# Patient Record
Sex: MALE | Race: WHITE | Employment: OTHER | URBAN - METROPOLITAN AREA
[De-identification: names, ages, dates, MRNs, and addresses within clinical notes are randomized per-mention and may not be internally consistent; named-entity substitution may affect disease eponyms.]

---

## 2019-10-04 ENCOUNTER — LAB ENCOUNTER (OUTPATIENT)
Dept: LAB | Age: 49
End: 2019-10-04
Attending: INTERNAL MEDICINE
Payer: COMMERCIAL

## 2019-10-04 DIAGNOSIS — Z00.01 ENCOUNTER FOR GENERAL ADULT MEDICAL EXAMINATION WITH ABNORMAL FINDINGS: Primary | ICD-10-CM

## 2019-10-04 PROCEDURE — 85025 COMPLETE CBC W/AUTO DIFF WBC: CPT

## 2019-10-04 PROCEDURE — 80061 LIPID PANEL: CPT

## 2019-10-04 PROCEDURE — 36415 COLL VENOUS BLD VENIPUNCTURE: CPT

## 2019-10-04 PROCEDURE — 84443 ASSAY THYROID STIM HORMONE: CPT

## 2019-10-04 PROCEDURE — 80053 COMPREHEN METABOLIC PANEL: CPT

## 2020-04-21 ENCOUNTER — HOSPITAL ENCOUNTER (EMERGENCY)
Facility: HOSPITAL | Age: 50
Discharge: HOME | End: 2020-04-21
Attending: EMERGENCY MEDICINE
Payer: COMMERCIAL

## 2020-04-21 VITALS
HEIGHT: 74 IN | SYSTOLIC BLOOD PRESSURE: 130 MMHG | DIASTOLIC BLOOD PRESSURE: 88 MMHG | RESPIRATION RATE: 18 BRPM | TEMPERATURE: 99 F | HEART RATE: 87 BPM | OXYGEN SATURATION: 99 % | WEIGHT: 195 LBS | BODY MASS INDEX: 25.03 KG/M2

## 2020-04-21 DIAGNOSIS — S81.812A LEG LACERATION, LEFT, INITIAL ENCOUNTER: Primary | ICD-10-CM

## 2020-04-21 PROCEDURE — 90471 IMMUNIZATION ADMIN: CPT

## 2020-04-21 PROCEDURE — 12034 INTMD RPR S/TR/EXT 7.6-12.5: CPT

## 2020-04-21 PROCEDURE — 99283 EMERGENCY DEPT VISIT LOW MDM: CPT

## 2020-04-21 RX ORDER — METHYLPHENIDATE HYDROCHLORIDE 10 MG/1
10 TABLET ORAL 2 TIMES DAILY
COMMUNITY
End: 2021-06-20 | Stop reason: ALTCHOICE

## 2020-04-21 NOTE — ED PROVIDER NOTES
Procedure Name: Laceration Repair  Location: Left proximal knee     PROCEDURE:  Laceration measures 8 cm, 1 cm gaping at its widest point, 0.5 cm depth  The appropriate timeout was taken. The area was prepped and draped in the usual sterile fashion.  Local

## 2020-04-21 NOTE — ED PROVIDER NOTES
Patient Seen in: [facility] Emergency Department      History   Patient presents with:  Laceration Abrasion    Stated Complaint: leg laceration    HPI    40-year-old male presents with a laceration to the left lower extremity.   He reports that about Neuro: Alert oriented and nonfocal   Skin: no rashes or nodules    ED Course   Labs Reviewed - No data to display               MDM     27-year-old male presents with laceration of the left leg from chainsaw. Please see above PA note for repair.   Neurov

## 2020-04-21 NOTE — ED INITIAL ASSESSMENT (HPI)
Pt reports left thigh/knee laceration from a chain saw. Range of motion still intact, pressure gauze on leg, bleeding controlled, breakthrough bleeding noted.

## 2021-06-20 ENCOUNTER — APPOINTMENT (OUTPATIENT)
Dept: GENERAL RADIOLOGY | Age: 51
End: 2021-06-20
Attending: PHYSICIAN ASSISTANT
Payer: COMMERCIAL

## 2021-06-20 ENCOUNTER — HOSPITAL ENCOUNTER (OUTPATIENT)
Age: 51
Discharge: HOME | End: 2021-06-20
Payer: COMMERCIAL

## 2021-06-20 VITALS
HEIGHT: 74 IN | TEMPERATURE: 98 F | DIASTOLIC BLOOD PRESSURE: 85 MMHG | SYSTOLIC BLOOD PRESSURE: 115 MMHG | RESPIRATION RATE: 12 BRPM | WEIGHT: 180 LBS | BODY MASS INDEX: 23.1 KG/M2 | HEART RATE: 65 BPM

## 2021-06-20 DIAGNOSIS — L08.9 SKIN INFECTION: ICD-10-CM

## 2021-06-20 DIAGNOSIS — S89.91XA INJURY OF RIGHT LOWER EXTREMITY, INITIAL ENCOUNTER: Primary | ICD-10-CM

## 2021-06-20 PROCEDURE — 99203 OFFICE O/P NEW LOW 30 MIN: CPT | Performed by: PHYSICIAN ASSISTANT

## 2021-06-20 PROCEDURE — 73590 X-RAY EXAM OF LOWER LEG: CPT | Performed by: PHYSICIAN ASSISTANT

## 2021-06-20 RX ORDER — CEPHALEXIN 500 MG/1
500 TABLET ORAL 4 TIMES DAILY
Qty: 40 TABLET | Refills: 0 | Status: SHIPPED | OUTPATIENT
Start: 2021-06-20 | End: 2021-06-30

## 2021-06-20 NOTE — ED INITIAL ASSESSMENT (HPI)
Pt c/o x 2 weeks ago was hit in right lower leg with a ground ball while playing softball and on 1st base. Pt noticed redness and warmth today and increased pain. Pt states very painful to bear weight.

## 2021-06-20 NOTE — ED PROVIDER NOTES
Patient Seen in: [facility] [address]      History   Patient presents with:  Leg or Foot Injury    Stated Complaint: Leg Injury    HPI/Subjective:   HPI    45 yo male with complaint of pain and swelling to his right lower extremity.   Patient s Resp 16   Ht 188 cm (6' 2\")   Wt 81.6 kg   BMI 23.11 kg/m²         Physical Exam  Vitals and nursing note reviewed. Constitutional:       Appearance: He is well-developed. HENT:      Head: Normocephalic.       Right Ear: External ear normal.      Left ago. Redness, swelling and pain in the area. FINDINGS:  BONES:  Normal.  No significant arthropathy or acute abnormality. SOFT TISSUES:  Negative. No visible soft tissue swelling. EFFUSION:  None visible. OTHER:  Negative.             CONCLUSION:  Negat Simran Figueroa MD  [facility] [phone]                Medications Prescribed:  Current Discharge Medication List    START taking these medications    Cephalexin 500 MG Oral Tab  Take 500 mg by mouth 4 (four)

## 2021-09-17 ENCOUNTER — APPOINTMENT (OUTPATIENT)
Dept: GENERAL RADIOLOGY | Age: 51
End: 2021-09-17
Attending: PHYSICIAN ASSISTANT
Payer: COMMERCIAL

## 2021-09-17 ENCOUNTER — HOSPITAL ENCOUNTER (OUTPATIENT)
Age: 51
Discharge: HOME | End: 2021-09-17
Payer: COMMERCIAL

## 2021-09-17 VITALS
RESPIRATION RATE: 18 BRPM | OXYGEN SATURATION: 99 % | TEMPERATURE: 98 F | HEIGHT: 74 IN | SYSTOLIC BLOOD PRESSURE: 110 MMHG | WEIGHT: 180 LBS | BODY MASS INDEX: 23.1 KG/M2 | HEART RATE: 75 BPM | DIASTOLIC BLOOD PRESSURE: 77 MMHG

## 2021-09-17 DIAGNOSIS — S99.922A INJURY OF LEFT FOOT, INITIAL ENCOUNTER: Primary | ICD-10-CM

## 2021-09-17 DIAGNOSIS — S92.515A CLOSED NONDISPLACED FRACTURE OF PROXIMAL PHALANX OF LESSER TOE OF LEFT FOOT, INITIAL ENCOUNTER: ICD-10-CM

## 2021-09-17 PROCEDURE — 99213 OFFICE O/P EST LOW 20 MIN: CPT | Performed by: PHYSICIAN ASSISTANT

## 2021-09-17 PROCEDURE — 73630 X-RAY EXAM OF FOOT: CPT | Performed by: PHYSICIAN ASSISTANT

## 2021-09-17 PROCEDURE — 29550 STRAPPING OF TOES: CPT | Performed by: PHYSICIAN ASSISTANT

## 2021-09-17 PROCEDURE — L3260 AMBULATORY SURGICAL BOOT EAC: HCPCS | Performed by: PHYSICIAN ASSISTANT

## 2021-09-17 NOTE — ED INITIAL ASSESSMENT (HPI)
Pt c/o injury to left toes and foot. Pt states he stubbed his foot and 3rd and 4th toes on an ottoman yesterday.

## 2021-09-17 NOTE — ED PROVIDER NOTES
Patient Seen in: [facility] [address]      History   Patient presents with:  Leg or Foot Injury    Stated Complaint: Left foot injury    Subjective:   HPI    CHIEF COMPLAINT: Left foot third and fourth toe pain     HISTORY OF PRESENT ILLNESS: 97.7 °F (36.5 °C)   Temp src Temporal   SpO2 99 %   O2 Device None (Room air)       Current:/77   Pulse 75   Temp 97.7 °F (36.5 °C) (Temporal)   Resp 18   Ht 188 cm (6' 2\")   Wt 81.6 kg   SpO2 99%   BMI 23.11 kg/m²         Physical Exam    Vital sig for x-rays of the left foot after he stubbed his third and fourth toes last night. X-rays of the left foot show an obliquely oriented nondisplaced fracture through the midshaft of the fourth proximal phalanx. Patient's toes were rosas tape together.   He

## 2022-08-22 ENCOUNTER — HOSPITAL ENCOUNTER (OUTPATIENT)
Age: 52
Discharge: HOME | End: 2022-08-22
Payer: COMMERCIAL

## 2022-08-22 VITALS
WEIGHT: 180 LBS | DIASTOLIC BLOOD PRESSURE: 64 MMHG | BODY MASS INDEX: 23.1 KG/M2 | HEIGHT: 74 IN | SYSTOLIC BLOOD PRESSURE: 102 MMHG | OXYGEN SATURATION: 99 % | HEART RATE: 80 BPM | RESPIRATION RATE: 17 BRPM | TEMPERATURE: 99 F

## 2022-08-22 DIAGNOSIS — L23.7 POISON IVY: Primary | ICD-10-CM

## 2022-08-22 PROCEDURE — 99213 OFFICE O/P EST LOW 20 MIN: CPT | Performed by: NURSE PRACTITIONER

## 2022-08-22 RX ORDER — PREDNISONE 20 MG/1
TABLET ORAL
Qty: 21 TABLET | Refills: 0 | Status: SHIPPED | OUTPATIENT
Start: 2022-08-22 | End: 2022-09-03

## 2023-05-25 ENCOUNTER — ORDER TRANSCRIPTION (OUTPATIENT)
Dept: PHYSICAL THERAPY | Facility: HOSPITAL | Age: 53
End: 2023-05-25

## 2023-05-25 DIAGNOSIS — M25.511 RIGHT SHOULDER PAIN: Primary | ICD-10-CM

## 2023-05-31 ENCOUNTER — TELEPHONE (OUTPATIENT)
Dept: PHYSICAL THERAPY | Facility: HOSPITAL | Age: 53
End: 2023-05-31

## 2023-06-02 ENCOUNTER — OFFICE VISIT (OUTPATIENT)
Dept: PHYSICAL THERAPY | Facility: HOSPITAL | Age: 53
End: 2023-06-02
Attending: INTERNAL MEDICINE
Payer: COMMERCIAL

## 2023-06-02 DIAGNOSIS — M25.511 ACUTE PAIN OF RIGHT SHOULDER: ICD-10-CM

## 2023-06-02 PROCEDURE — 97110 THERAPEUTIC EXERCISES: CPT | Performed by: PHYSICAL THERAPIST

## 2023-06-02 PROCEDURE — 97161 PT EVAL LOW COMPLEX 20 MIN: CPT | Performed by: PHYSICAL THERAPIST

## 2023-06-02 NOTE — PATIENT INSTRUCTIONS
HOME EXERCISE PROGRAM [[id number]]    SIDELYING INTERNAL ROTATION STRETCH - IR SLEEPER STRETCH -  Repeat 5 Times, Hold 30 Seconds, Complete 1 Set, Perform 2 Times a Day    Seated ER with TheraBand -  Repeat 10 Times, Complete 2 Sets, Perform 1 Times a Day    PECTORALIS DOORWAY STRETCH - SINGLE ARM -  Repeat 5 Times, Hold 10 Seconds, Complete 1 Set, Perform 1 Times a Day    SCAPULAR RETRACTIONS -  Repeat 10 Times, Hold 1 Second(s), Complete 1 Set, Perform 1 Times a Day

## 2023-06-05 ENCOUNTER — OFFICE VISIT (OUTPATIENT)
Dept: PHYSICAL THERAPY | Facility: HOSPITAL | Age: 53
End: 2023-06-05
Attending: INTERNAL MEDICINE
Payer: COMMERCIAL

## 2023-06-05 PROCEDURE — 97110 THERAPEUTIC EXERCISES: CPT | Performed by: PHYSICAL THERAPIST

## 2023-06-05 PROCEDURE — 97140 MANUAL THERAPY 1/> REGIONS: CPT | Performed by: PHYSICAL THERAPIST

## 2023-06-09 ENCOUNTER — APPOINTMENT (OUTPATIENT)
Dept: PHYSICAL THERAPY | Facility: HOSPITAL | Age: 53
End: 2023-06-09
Attending: INTERNAL MEDICINE
Payer: COMMERCIAL

## 2023-06-12 ENCOUNTER — OFFICE VISIT (OUTPATIENT)
Dept: PHYSICAL THERAPY | Facility: HOSPITAL | Age: 53
End: 2023-06-12
Attending: INTERNAL MEDICINE
Payer: COMMERCIAL

## 2023-06-12 PROCEDURE — 97140 MANUAL THERAPY 1/> REGIONS: CPT

## 2023-06-12 PROCEDURE — 97110 THERAPEUTIC EXERCISES: CPT

## 2023-06-27 ENCOUNTER — APPOINTMENT (OUTPATIENT)
Dept: PHYSICAL THERAPY | Facility: HOSPITAL | Age: 53
End: 2023-06-27
Attending: INTERNAL MEDICINE
Payer: COMMERCIAL

## 2023-06-27 ENCOUNTER — TELEPHONE (OUTPATIENT)
Dept: PHYSICAL THERAPY | Facility: HOSPITAL | Age: 53
End: 2023-06-27

## 2023-06-29 ENCOUNTER — OFFICE VISIT (OUTPATIENT)
Dept: PHYSICAL THERAPY | Facility: HOSPITAL | Age: 53
End: 2023-06-29
Attending: INTERNAL MEDICINE
Payer: COMMERCIAL

## 2023-06-29 PROCEDURE — 97110 THERAPEUTIC EXERCISES: CPT

## 2023-06-29 PROCEDURE — 97140 MANUAL THERAPY 1/> REGIONS: CPT

## 2023-08-08 ENCOUNTER — APPOINTMENT (OUTPATIENT)
Dept: PHYSICAL THERAPY | Facility: HOSPITAL | Age: 53
End: 2023-08-08
Attending: INTERNAL MEDICINE
Payer: COMMERCIAL

## 2023-08-10 ENCOUNTER — TELEPHONE (OUTPATIENT)
Dept: PHYSICAL THERAPY | Facility: HOSPITAL | Age: 53
End: 2023-08-10

## 2023-08-10 ENCOUNTER — APPOINTMENT (OUTPATIENT)
Dept: PHYSICAL THERAPY | Facility: HOSPITAL | Age: 53
End: 2023-08-10
Attending: INTERNAL MEDICINE
Payer: COMMERCIAL

## 2023-08-14 ENCOUNTER — APPOINTMENT (OUTPATIENT)
Dept: PHYSICAL THERAPY | Facility: HOSPITAL | Age: 53
End: 2023-08-14
Attending: INTERNAL MEDICINE
Payer: COMMERCIAL

## 2023-08-16 ENCOUNTER — APPOINTMENT (OUTPATIENT)
Dept: PHYSICAL THERAPY | Facility: HOSPITAL | Age: 53
End: 2023-08-16
Attending: INTERNAL MEDICINE
Payer: COMMERCIAL

## 2023-08-24 ENCOUNTER — TELEPHONE (OUTPATIENT)
Dept: PHYSICAL THERAPY | Facility: HOSPITAL | Age: 53
End: 2023-08-24

## 2023-08-24 ENCOUNTER — APPOINTMENT (OUTPATIENT)
Dept: PHYSICAL THERAPY | Facility: HOSPITAL | Age: 53
End: 2023-08-24
Attending: INTERNAL MEDICINE
Payer: COMMERCIAL

## 2023-08-24 NOTE — TELEPHONE ENCOUNTER
CLM about missed appts on 8/22/23 and 8/24/23 . Left message to call to confirm future appointment  on 8/29/23.

## 2023-08-29 ENCOUNTER — APPOINTMENT (OUTPATIENT)
Dept: PHYSICAL THERAPY | Facility: HOSPITAL | Age: 53
End: 2023-08-29
Attending: INTERNAL MEDICINE
Payer: COMMERCIAL

## 2023-08-31 ENCOUNTER — APPOINTMENT (OUTPATIENT)
Dept: PHYSICAL THERAPY | Facility: HOSPITAL | Age: 53
End: 2023-08-31
Attending: INTERNAL MEDICINE
Payer: COMMERCIAL

## 2024-03-07 PROBLEM — Z12.11 SPECIAL SCREENING FOR MALIGNANT NEOPLASMS, COLON: Status: ACTIVE | Noted: 2024-03-07

## 2024-05-13 ENCOUNTER — HOSPITAL ENCOUNTER (OUTPATIENT)
Dept: MRI IMAGING | Facility: HOSPITAL | Age: 54
Discharge: HOME | End: 2024-05-13
Attending: INTERNAL MEDICINE

## 2024-05-13 DIAGNOSIS — S43.401A SPRAIN OF SHOULDER, RIGHT: ICD-10-CM

## 2024-05-13 PROCEDURE — 73221 MRI JOINT UPR EXTREM W/O DYE: CPT | Performed by: INTERNAL MEDICINE

## 2024-10-17 ENCOUNTER — HOSPITAL ENCOUNTER (OUTPATIENT)
Dept: ULTRASOUND IMAGING | Age: 54
Discharge: HOME | End: 2024-10-17
Attending: INTERNAL MEDICINE
Payer: COMMERCIAL

## 2024-10-17 DIAGNOSIS — R14.0 ABDOMINAL BLOATING: ICD-10-CM

## 2024-10-17 PROCEDURE — 76700 US EXAM ABDOM COMPLETE: CPT | Performed by: INTERNAL MEDICINE

## 2025-01-14 ENCOUNTER — APPOINTMENT (OUTPATIENT)
Dept: GENERAL RADIOLOGY | Age: 55
End: 2025-01-14
Attending: NURSE PRACTITIONER
Payer: COMMERCIAL

## 2025-01-14 ENCOUNTER — HOSPITAL ENCOUNTER (OUTPATIENT)
Age: 55
Discharge: HOME | End: 2025-01-14
Payer: COMMERCIAL

## 2025-01-14 VITALS
SYSTOLIC BLOOD PRESSURE: 114 MMHG | TEMPERATURE: 99 F | OXYGEN SATURATION: 100 % | HEART RATE: 81 BPM | DIASTOLIC BLOOD PRESSURE: 87 MMHG | RESPIRATION RATE: 19 BRPM

## 2025-01-14 DIAGNOSIS — S89.91XA INJURY OF RIGHT LOWER LEG, INITIAL ENCOUNTER: Primary | ICD-10-CM

## 2025-01-14 PROCEDURE — 99213 OFFICE O/P EST LOW 20 MIN: CPT | Performed by: NURSE PRACTITIONER

## 2025-01-14 PROCEDURE — 73590 X-RAY EXAM OF LOWER LEG: CPT | Performed by: NURSE PRACTITIONER

## 2025-01-14 RX ORDER — MUPIROCIN 20 MG/G
1 OINTMENT TOPICAL 2 TIMES DAILY
Qty: 15 G | Refills: 0 | Status: SHIPPED | OUTPATIENT
Start: 2025-01-14 | End: 2025-01-24

## 2025-01-15 NOTE — ED PROVIDER NOTES
History     Chief Complaint   Patient presents with    Leg or Foot Injury    Laceration/Abrasion       Subjective:   HPI    Frederick Patel, 54 year old male with notable medical history of n/a who presents with Right lower leg injury. Patient reports moving furniture today when a pallet landed on his Right anterior lower leg ~1200. Denies other injuries or complaints. Patient did shower pta.        Patient Active Problem List   Diagnosis    Memory loss    Effusion of left knee    Knee internal derangement, left    Medial meniscus tear, left, initial encounter    Special screening for malignant neoplasms, colon      Objective:   Past Medical History:    Abdominal pain    Bloating    Constipation    Frequent use of laxatives    Irregular bowel habits    Uncomfortable fullness after meals    Wears glasses              Past Surgical History:   Procedure Laterality Date    Hand/finger surgery unlisted Right 1985    Right thumb surgery    Other surgical history      Toe surgery                Social History     Socioeconomic History    Marital status:    Tobacco Use    Smoking status: Never    Smokeless tobacco: Never   Vaping Use    Vaping status: Never Used   Substance and Sexual Activity    Alcohol use: Yes     Alcohol/week: 3.0 standard drinks of alcohol     Types: 3 Standard drinks or equivalent per week     Comment: weekly/socially, 3 drinks at a time    Drug use: Never              Medications Ordered Prior to Encounter[1]      Constitutional and vital signs reviewed.      All other systems reviewed and negative except as noted above.    I have reviewed the family history, social history, allergies, and outpatient medications.     History reviewed from EMR: Encounters, problem list, allergies, medications      Physical Exam     ED Triage Vitals [01/14/25 1807]   /87   Pulse 81   Resp 19   Temp 98.6 °F (37 °C)   Temp src Oral   SpO2 100 %   O2 Device None (Room air)       Current:/87    Pulse 81   Temp 98.6 °F (37 °C) (Oral)   Resp 19   SpO2 100%       Physical Exam  Vitals and nursing note reviewed.   Constitutional:       General: He is not in acute distress.     Appearance: Normal appearance. He is normal weight. He is not ill-appearing or toxic-appearing.   HENT:      Head: Normocephalic and atraumatic.      Right Ear: External ear normal.      Left Ear: External ear normal.      Nose: Nose normal. No congestion or rhinorrhea.      Mouth/Throat:      Mouth: Mucous membranes are moist.   Eyes:      Extraocular Movements: Extraocular movements intact.      Conjunctiva/sclera: Conjunctivae normal.      Pupils: Pupils are equal, round, and reactive to light.   Cardiovascular:      Rate and Rhythm: Normal rate.      Pulses: Normal pulses.   Pulmonary:      Effort: Pulmonary effort is normal. No respiratory distress.   Musculoskeletal:         General: No swelling or signs of injury. Normal range of motion.      Cervical back: Normal range of motion.      Right lower leg: Swelling and tenderness present.      Comments: Tenderness with mild swelling to anterior tibial region w/ a few superficial abrasions / skin tears. No active bleeding. CMS intact. No compartment syndrome.   Skin:     General: Skin is warm and dry.      Capillary Refill: Capillary refill takes less than 2 seconds.   Neurological:      General: No focal deficit present.      Mental Status: He is alert and oriented to person, place, and time. Mental status is at baseline.   Psychiatric:         Mood and Affect: Mood normal.         Behavior: Behavior normal.         Thought Content: Thought content normal.         Judgment: Judgment normal.            ED Course     Labs Reviewed - No data to display  XR TIBIA + FIBULA (2 VIEWS), RIGHT (CPT=73590)   Final Result   PROCEDURE:  XR TIBIA + FIBULA (2 VIEWS), RIGHT (CPT=73590)       TECHNIQUE:  AP and lateral views of the tibia and fibula were obtained.       COMPARISON:  None.        INDICATIONS:  heavy item fell on Right anterior lower leg, +swelling,    bruising, superficial lacerations of the left leg.       PATIENT STATED HISTORY: (As transcribed by Technologist)  Worsening pain    and bruising of the right anterior lower leg. Pt. tripped over boxes    today, and a pallet fell on his leg.             FINDINGS:     BONES:  Normal.  No significant arthropathy or acute abnormality.   SOFT TISSUES:  Negative.  No visible soft tissue swelling.   EFFUSION:  None visible.   OTHER:  Negative.                         =====   CONCLUSION:  Negative exam.           LOCATION:  [facility]           Dictated by (CST): Michael Zurita DO on 1/14/2025 at 7:17 PM        Finalized by (CST): Michael Zurita DO on 1/14/2025 at 7:18 PM             Vitals:    01/14/25 1807   BP: 114/87   Pulse: 81   Resp: 19   Temp: 98.6 °F (37 °C)   TempSrc: Oral   SpO2: 100%            [facility]        Frederick Patel, 54 year old male with medical history as noted above who presents with RLE injury   - Patient in NAD, VSS   - contusion vs fracture vs hematoma vs other   - Xray ordered   - Patient declined additional cleaning in IC as patient showered pta.   - tetanus UTD (4/2020)       ** See ED course below for additional information on care provided / interventions / notable events throughout patient's encounter.    ** See Home Care Instructions below for care measures to trial as applicable.    ED Course as of 01/14/25 1921  ------------------------------------------------------------  Time: 01/14 1900  Comment: Self read of imaging w/o obvious acute process. Awaiting official read.    ------------------------------------------------------------  Time: 01/14 1919  Comment: Radiology confirming no acute fracture  Supportive care and wound care discussed  RTED/IC precautions discussed  Follow up with primary care provider as needed        ** I have independently reviewed the radiology images, clinical lab results, and ECG tracings as  described above (if applicable)    ** Concerning co-morbidities possibly affecting complaint / care: n/a    ** See disposition & plan section below for home care instructions - if applicable        Medical Decision Making  Amount and/or Complexity of Data Reviewed  Radiology: ordered and independent interpretation performed. Decision-making details documented in ED Course.    Risk  OTC drugs.        Disposition and Plan     Disposition:  Discharge  1/14/2025  7:21 pm    Clinical Impression:  1. Injury of right lower leg, initial encounter            Home care instructions:     - Clean and dry gently   - Apply antibiotic ointment and bandage 1-2x daily for 1-week   - You may benefit from compression with ace wrap throughout the day   - Avoid excessive walking, standing, pressure to site   - If you notice severe worsening of pain / swelling / sensation changes to area or to foot, go to ER      Follow-up:  Caroline Duran, PAMEHDI  [address] [address]  [phone]      Ortho contact    Solomon Stark MD  [address] [address]  [phone]      As needed          Medications Prescribed:  Current Discharge Medication List        START taking these medications    Details   mupirocin 2 % External Ointment Apply 1 Application topically 2 (two) times daily for 10 days.  Qty: 15 g, Refills: 0               Navid Valdovinos, FLIP, APRN, AGACNP-BC, FNP-C, CNL  Adult-Gerontology Acute Care & Family Nurse Practitioner  [facility]      The above patient (and/or guardian) was made aware that an appropriate evaluation has been performed, and that no additional testing is required at this time. In my medical judgment, there is currently no evidence of an immediate life-threatening or surgical condition, therefore discharge is indicated at this time. The patient (and/or guardian) was advised that a small risk still exists that a serious condition could develop. The patient  was instructed to arrange close follow-up with their primary care provider (or the referral provider given today). The patient received written and verbal instructions regarding their condition / concerns, demonstrated understanding, and is agreement with the outpatient treatment plan.              [1]   No current facility-administered medications on file prior to encounter.     Current Outpatient Medications on File Prior to Encounter   Medication Sig Dispense Refill    amphetamine-dextroamphetamine ER (ADDERALL XR) 30 MG Oral Capsule SR 24 Hr Take 1 capsule (30 mg total) by mouth daily. 30 capsule 0    [START ON 2025] amphetamine-dextroamphetamine ER (ADDERALL XR) 30 MG Oral Capsule SR 24 Hr Take 1 capsule (30 mg total) by mouth daily. 30 capsule 0    [START ON 3/12/2025] amphetamine-dextroamphetamine ER (ADDERALL XR) 30 MG Oral Capsule SR 24 Hr Take 1 capsule (30 mg total) by mouth daily. 30 capsule 0    [] Amphetamine-Dextroamphet ER (ADDERALL XR) 25 MG Oral Capsule SR 24 Hr Take 1 capsule (25 mg total) by mouth daily. 30 capsule 0    [] Amphetamine-Dextroamphet ER (ADDERALL XR) 25 MG Oral Capsule SR 24 Hr Take 1 capsule (25 mg total) by mouth daily. 30 capsule 0    [] amphetamine-dextroamphetamine (ADDERALL) 10 MG Oral Tab Take 1 tablet (10 mg total) by mouth daily. 30 tablet 0    [] amphetamine-dextroamphetamine (ADDERALL) 10 MG Oral Tab Take 1 tablet (10 mg total) by mouth daily. 30 tablet 0    [] amphetamine-dextroamphetamine (ADDERALL) 10 MG Oral Tab Take 1 tablet (10 mg total) by mouth daily. 30 tablet 0    Sildenafil Citrate 100 MG Oral Tab Take 1 tablet (100 mg total) by mouth as needed.      finasteride 1 MG Oral Tab Take 1 tablet (1 mg total) by mouth daily.

## 2025-01-15 NOTE — ED INITIAL ASSESSMENT (HPI)
PATIENT REPORTS INJURING RLE AFTER MOVING FURNITURE TRIPPED OVER BOXES, PALLET FELL ON LOWER LEG CAUSING ABRASIONS BRUISING AND SWELLING. PAIN HAS BEEN BECOMING WORSE AND HAVING DIFFICULTY AMBULATING.

## 2025-01-15 NOTE — DISCHARGE INSTRUCTIONS
- Clean and dry gently   - Apply antibiotic ointment and bandage 1-2x daily for 1-week   - You may benefit from compression with ace wrap throughout the day   - Avoid excessive walking, standing, pressure to site   - If you notice severe worsening of pain / swelling / sensation changes to area or to foot, go to ER

## 2025-04-25 ENCOUNTER — HOSPITAL ENCOUNTER (OUTPATIENT)
Age: 55
Discharge: HOME | End: 2025-04-25
Payer: COMMERCIAL

## 2025-04-25 ENCOUNTER — APPOINTMENT (OUTPATIENT)
Dept: GENERAL RADIOLOGY | Age: 55
End: 2025-04-25
Attending: NURSE PRACTITIONER
Payer: COMMERCIAL

## 2025-04-25 VITALS
OXYGEN SATURATION: 97 % | RESPIRATION RATE: 18 BRPM | SYSTOLIC BLOOD PRESSURE: 120 MMHG | HEART RATE: 68 BPM | DIASTOLIC BLOOD PRESSURE: 84 MMHG | TEMPERATURE: 98 F

## 2025-04-25 DIAGNOSIS — J22 LOWER RESPIRATORY INFECTION: Primary | ICD-10-CM

## 2025-04-25 DIAGNOSIS — R05.2 SUBACUTE COUGH: ICD-10-CM

## 2025-04-25 PROBLEM — F51.01 PRIMARY INSOMNIA: Status: ACTIVE | Noted: 2025-04-15

## 2025-04-25 PROBLEM — K21.00 GASTROESOPHAGEAL REFLUX DISEASE WITH ESOPHAGITIS: Status: ACTIVE | Noted: 2024-09-09

## 2025-04-25 PROBLEM — L65.9 ALOPECIA: Status: ACTIVE | Noted: 2024-09-09

## 2025-04-25 PROBLEM — F41.9 ANXIETY: Status: ACTIVE | Noted: 2024-09-09

## 2025-04-25 PROBLEM — F90.2 ATTENTION DEFICIT HYPERACTIVITY DISORDER, COMBINED TYPE: Status: ACTIVE | Noted: 2024-09-09

## 2025-04-25 PROCEDURE — 71046 X-RAY EXAM CHEST 2 VIEWS: CPT | Performed by: NURSE PRACTITIONER

## 2025-04-25 PROCEDURE — 99213 OFFICE O/P EST LOW 20 MIN: CPT | Performed by: NURSE PRACTITIONER

## 2025-04-25 RX ORDER — AZITHROMYCIN 250 MG/1
TABLET, FILM COATED ORAL
Qty: 6 TABLET | Refills: 0 | Status: SHIPPED | OUTPATIENT
Start: 2025-04-25 | End: 2025-04-30

## 2025-04-25 NOTE — ED INITIAL ASSESSMENT (HPI)
Pt has had a bad sore throat and URI symptoms for the past 3 weeks, that has just gotten worse.  He has a dull headache and cough

## 2025-04-25 NOTE — DISCHARGE INSTRUCTIONS
- Per our discussion: Pure Encapsulations (mens multi, D3 w/ K2, NAC, ashwagandha, etc.)   - Given your duration of symptoms, we will treat you as an atypical lower respiratory infection   - See below for supportive care measures    Supportive care measures to try as applicable:  General:   - There are multiple viruses that cause similar symptoms, including: Influenza, Rhinovirus, Adenovirus, Coronaviruses (including Covid-19), RSV, parainfluenza, etc.   - Duration of symptoms typically depends on your body's ability to heal itself, which can be affected in many ways including metabolic health, diet, and genetics.    - Symptoms typically last between 7-days to 3-weeks   - Most medications do not not cure the illness, but may help to alleviate your symptoms. However, evidence is not strong.   - Antibiotics are NOT effective for viral illnesses   - Drink plenty of fluids (water, Pedialyte, etc.)   - Get plenty of rest   - Take a multivitamin and extra Vitamin D (~2000IU+) daily, year round   - Vitamin C can help reduce symptoms if you become infected, but is more effective if taken before becoming ill   - You may benefit from Zinc (~20mg) every day, or every other other day, for a week while sick (Zinc has been shown to kill respiratory viruses)   - Alternate Naproxen / Aleve (adult: 440-500mg) & Tylenol (adult: 650-1000mg) as needed for pain / body aches / fever   - Limit excess sugar intake (can worsen inflammation caused by virus)    Infection Control:   - Wash hands often, change toothbrush, & disinfect \"high-touch\" items to limit viral spread   - Do not share utensils or drinks    Sore throat:   - Salt water gargles & Lozenges (Cepacol or Ricola)    Sinus:   - Using saline spray or a couple drops into nostrils a couple times a day can help with sinus inflammation & move mucus   - Avoid having air blow on your face as this can worsen congestion   - You may benefit from placing a garlic clove in each nostril for  10-15min which should irritate sinuses, causing you to get rid of stuck mucus. Blow nose afterwards.   - You may benefit from taking a decongestant (e.g. Sudafed - pseudoephedrine [behind the pharmacy counter]) (may temporarily elevate your heart rate and blood pressure)   - You may benefit from using a humidifier and/or steam showers then blow nose   - You may benefit from Flonase nasal spray daily (use with head tilted down and tip pointed towards outside of eye. Breath normally. You should not feel medication go down your throat)   - You may benefit from taking a daily allergy medication (e.g. Zyrtec, Xyzal, etc.)   - You may benefit from boiling water with lemon and cayenne pepper, then breathing in the steam (you can cover your head with a towel to help funnel the steam)    Cough:   - You may benefit from spoonfuls of honey (or added to warm drinks) throughout the day   - You may benefit from cough medication containing \"Dextromethorphan [DM]\" (e.g. Delsym)   - Sleeping in an upright position

## 2025-04-25 NOTE — ED PROVIDER NOTES
History     Chief Complaint   Patient presents with    Cough/URI       Subjective:   HPI    Frederick Patel, 55 year old male with notable medical history of n/a who presents with cough. PATIENT reports having URI symptoms starting approx 3-weeks ago which mildly improve, but returned / worsened approx 3-4 days ago. Tolerating PO well. +sleep disturbance d/t cough. Denies fever, BUSTILLOS, palpitations.       Problem List[1]   Objective:   Past Medical History:    Abdominal pain    Bloating    Constipation    Frequent use of laxatives    Irregular bowel habits    Uncomfortable fullness after meals    Wears glasses              Past Surgical History:   Procedure Laterality Date    Hand/finger surgery unlisted Right 1985    Right thumb surgery    Other surgical history      Toe surgery                Social History     Socioeconomic History    Marital status:    Tobacco Use    Smoking status: Never    Smokeless tobacco: Never   Vaping Use    Vaping status: Never Used   Substance and Sexual Activity    Alcohol use: Yes     Alcohol/week: 3.0 standard drinks of alcohol     Types: 3 Standard drinks or equivalent per week     Comment: weekly/socially, 3 drinks at a time    Drug use: Never              Medications Ordered Prior to Encounter[2]      Constitutional and vital signs reviewed.      All other systems reviewed and negative except as noted above.    I have reviewed the family history, social history, allergies, and outpatient medications.     History reviewed from EMR: Encounters, problem list, allergies, medications      Physical Exam     ED Triage Vitals [04/25/25 1120]   /84   Pulse 68   Resp 18   Temp 98.2 °F (36.8 °C)   Temp src Oral   SpO2 97 %   O2 Device None (Room air)       Current:/84   Pulse 68   Temp 98.2 °F (36.8 °C) (Oral)   Resp 18   SpO2 97%       Physical Exam  Vitals and nursing note reviewed.   Constitutional:       General: He is not in acute distress.     Appearance:  Normal appearance. He is normal weight. He is not ill-appearing or toxic-appearing.   HENT:      Head: Normocephalic and atraumatic.      Right Ear: External ear normal.      Left Ear: External ear normal.      Nose: Nose normal. No congestion or rhinorrhea.      Mouth/Throat:      Mouth: Mucous membranes are moist.   Eyes:      Extraocular Movements: Extraocular movements intact.      Conjunctiva/sclera: Conjunctivae normal.      Pupils: Pupils are equal, round, and reactive to light.   Cardiovascular:      Rate and Rhythm: Normal rate and regular rhythm.      Pulses: Normal pulses.      Heart sounds: Normal heart sounds.   Pulmonary:      Effort: Pulmonary effort is normal. No respiratory distress.      Breath sounds: Normal air entry. Examination of the right-lower field reveals decreased breath sounds. Decreased breath sounds present.      Comments: No distress or wheezing  Musculoskeletal:         General: No swelling, tenderness or signs of injury. Normal range of motion.      Cervical back: Normal range of motion.   Skin:     General: Skin is warm and dry.      Capillary Refill: Capillary refill takes less than 2 seconds.   Neurological:      General: No focal deficit present.      Mental Status: He is alert and oriented to person, place, and time. Mental status is at baseline.   Psychiatric:         Mood and Affect: Mood normal.         Behavior: Behavior normal.         Thought Content: Thought content normal.         Judgment: Judgment normal.            ED Course     Labs Reviewed - No data to display  XR CHEST PA + LAT CHEST (CPT=71046)   Final Result   PROCEDURE:  XR CHEST PA + LAT CHEST (CPT=71046)       INDICATIONS:  cough x3 weeks, mildly diminished to RLL       COMPARISON:  None.       TECHNIQUE:  PA and lateral chest radiographs were obtained.       PATIENT STATED HISTORY: (As transcribed by Technologist)  3 weeks of    congestion with cough.            FINDINGS:  Normal heart size and pulmonary  vascularity. No pleural    effusion or pneumothorax. No lobar consolidation. Peribronchial thickening    with mild hyperinflation could be related to bronchitis and/or asthma.    Clinical correlation recommended.                         =====   CONCLUSION:  Peribronchial thickening with mild hyperinflation could be    related to bronchitis and/or asthma. Clinical correlation recommended.           LOCATION:  [facility]           Dictated by (CST): Wes Stanford MD on 4/25/2025 at 11:55 AM        Finalized by (CST): Wes Stanford MD on 4/25/2025 at 11:55 AM             Vitals:    04/25/25 1120   BP: 120/84   Pulse: 68   Resp: 18   Temp: 98.2 °F (36.8 °C)   TempSrc: Oral   SpO2: 97%            [facility]        Frederick Patel, 55 year old male with medical history as noted above who presents with cough   - Patient in NAD, VSS   - post-viral cough vs other new viral vs PNA vs other   - Given duration of symptoms and dec LS to RLL, CXR ordered       ** See ED course below for additional information on care provided / interventions / notable events throughout patient's encounter.      ED Course as of 04/25/25 1200  ------------------------------------------------------------  Time: 04/25 1159  Comment: Radiology noting no consolidation  Given duration of symptoms, will treat as atypical lower respiratory infection  Supportive care and infection control measures discussed  Follow up with primary care provider as needed          ** I have independently reviewed the radiology images, clinical lab results, and ECG tracings as described above (if applicable)    ** Concerning co-morbidities possibly affecting complaint / care: n/a        Medical Decision Making  Amount and/or Complexity of Data Reviewed  Radiology: ordered and independent interpretation performed. Decision-making details documented in ED Course.    Risk  OTC drugs.  Prescription drug management.        Disposition and Plan     Disposition:  Discharge  4/25/2025  11:59 am    Clinical Impression:  1. Lower respiratory infection    2. Subacute cough            Home care instructions:     - Per our discussion: Pure Encapsulations (mens multi, D3 w/ K2, NAC, ashwagandha, etc.)   - Given your duration of symptoms, we will treat you as an atypical lower respiratory infection   - See below for supportive care measures    Supportive care measures to try as applicable:  General:   - There are multiple viruses that cause similar symptoms, including: Influenza, Rhinovirus, Adenovirus, Coronaviruses (including Covid-19), RSV, parainfluenza, etc.   - Duration of symptoms typically depends on your body's ability to heal itself, which can be affected in many ways including metabolic health, diet, and genetics.    - Symptoms typically last between 7-days to 3-weeks   - Most medications do not not cure the illness, but may help to alleviate your symptoms. However, evidence is not strong.   - Antibiotics are NOT effective for viral illnesses   - Drink plenty of fluids (water, Pedialyte, etc.)   - Get plenty of rest   - Take a multivitamin and extra Vitamin D (~2000IU+) daily, year round   - Vitamin C can help reduce symptoms if you become infected, but is more effective if taken before becoming ill   - You may benefit from Zinc (~20mg) every day, or every other other day, for a week while sick (Zinc has been shown to kill respiratory viruses)   - Alternate Naproxen / Aleve (adult: 440-500mg) & Tylenol (adult: 650-1000mg) as needed for pain / body aches / fever   - Limit excess sugar intake (can worsen inflammation caused by virus)    Infection Control:   - Wash hands often, change toothbrush, & disinfect \"high-touch\" items to limit viral spread   - Do not share utensils or drinks    Sore throat:   - Salt water gargles & Lozenges (Cepacol or Ricola)    Sinus:   - Using saline spray or a couple drops into nostrils a couple times a day can help with sinus inflammation & move mucus   - Avoid  having air blow on your face as this can worsen congestion   - You may benefit from placing a garlic clove in each nostril for 10-15min which should irritate sinuses, causing you to get rid of stuck mucus. Blow nose afterwards.   - You may benefit from taking a decongestant (e.g. Sudafed - pseudoephedrine [behind the pharmacy counter]) (may temporarily elevate your heart rate and blood pressure)   - You may benefit from using a humidifier and/or steam showers then blow nose   - You may benefit from Flonase nasal spray daily (use with head tilted down and tip pointed towards outside of eye. Breath normally. You should not feel medication go down your throat)   - You may benefit from taking a daily allergy medication (e.g. Zyrtec, Xyzal, etc.)   - You may benefit from boiling water with lemon and cayenne pepper, then breathing in the steam (you can cover your head with a towel to help funnel the steam)    Cough:   - You may benefit from spoonfuls of honey (or added to warm drinks) throughout the day   - You may benefit from cough medication containing \"Dextromethorphan [DM]\" (e.g. Delsym)   - Sleeping in an upright position        Follow-up:  Solomon Stark MD  [address] [facility] [place] [address] [phone]      As needed          Medications Prescribed:  Current Discharge Medication List        START taking these medications    Details   azithromycin (ZITHROMAX Z-NANO) 250 MG Oral Tab 500 mg once followed by 250 mg daily x 4 days  Qty: 6 tablet, Refills: 0               Navid Valdovinos, DNP, APRN, AGACNP-BC, FNP-C, CNL  Adult-Gerontology Acute Care & Family Nurse Practitioner  [facility]      The above patient (and/or guardian) was made aware that an appropriate evaluation has been performed, and that no additional testing is required at this time. In my medical judgment, there is currently no evidence of an immediate life-threatening or surgical condition, therefore discharge is indicated  at this time. The patient (and/or guardian) was advised that a small risk still exists that a serious condition could develop. The patient was instructed to arrange close follow-up with their primary care provider (or the referral provider given today). The patient received written and verbal instructions regarding their condition / concerns, demonstrated understanding, and is agreement with the outpatient treatment plan.              [1]   Patient Active Problem List  Diagnosis    Memory loss    Effusion of left knee    Knee internal derangement, left    Medial meniscus tear, left, initial encounter    Special screening for malignant neoplasms, colon    Anxiety    Alopecia    Attention deficit hyperactivity disorder, combined type    Gastroesophageal reflux disease with esophagitis    Primary insomnia   [2]   No current facility-administered medications on file prior to encounter.     Current Outpatient Medications on File Prior to Encounter   Medication Sig Dispense Refill    amphetamine-dextroamphetamine ER (ADDERALL XR) 30 MG Oral Capsule SR 24 Hr Take 1 capsule (30 mg total) by mouth daily. 30 capsule 0    Sildenafil Citrate 100 MG Oral Tab Take 1 tablet (100 mg total) by mouth as needed.      finasteride 1 MG Oral Tab Take 1 tablet (1 mg total) by mouth in the morning.      [START ON 5/21/2025] amphetamine-dextroamphetamine ER (ADDERALL XR) 30 MG Oral Capsule SR 24 Hr Take 1 capsule (30 mg total) by mouth daily. 30 capsule 0    [START ON 6/18/2025] amphetamine-dextroamphetamine ER (ADDERALL XR) 30 MG Oral Capsule SR 24 Hr Take 1 capsule (30 mg total) by mouth daily. 30 capsule 0    amphetamine-dextroamphetamine (ADDERALL) 10 MG Oral Tab Take 1 tablet (10 mg total) by mouth daily. 30 tablet 0    [START ON 5/4/2025] amphetamine-dextroamphetamine (ADDERALL) 10 MG Oral Tab Take 1 tablet (10 mg total) by mouth daily. 30 tablet 0    [START ON 6/4/2025] amphetamine-dextroamphetamine (ADDERALL) 10 MG Oral Tab Take 1  tablet (10 mg total) by mouth daily. 30 tablet 0    [] amphetamine-dextroamphetamine ER (ADDERALL XR) 30 MG Oral Capsule SR 24 Hr Take 1 capsule (30 mg total) by mouth daily. 30 capsule 0    [] amphetamine-dextroamphetamine ER (ADDERALL XR) 30 MG Oral Capsule SR 24 Hr Take 1 capsule (30 mg total) by mouth daily. 30 capsule 0    [] amphetamine-dextroamphetamine ER (ADDERALL XR) 30 MG Oral Capsule SR 24 Hr Take 1 capsule (30 mg total) by mouth daily. 30 capsule 0

## 2025-05-03 ENCOUNTER — HOSPITAL ENCOUNTER (OUTPATIENT)
Age: 55
Discharge: HOME | End: 2025-05-03
Payer: COMMERCIAL

## 2025-05-03 ENCOUNTER — APPOINTMENT (OUTPATIENT)
Dept: GENERAL RADIOLOGY | Age: 55
End: 2025-05-03
Attending: NURSE PRACTITIONER
Payer: COMMERCIAL

## 2025-05-03 VITALS
TEMPERATURE: 98 F | HEART RATE: 75 BPM | RESPIRATION RATE: 18 BRPM | SYSTOLIC BLOOD PRESSURE: 112 MMHG | OXYGEN SATURATION: 98 % | DIASTOLIC BLOOD PRESSURE: 81 MMHG

## 2025-05-03 DIAGNOSIS — S51.832A PUNCTURE WOUND OF LEFT FOREARM, INITIAL ENCOUNTER: Primary | ICD-10-CM

## 2025-05-03 PROCEDURE — 99213 OFFICE O/P EST LOW 20 MIN: CPT | Performed by: NURSE PRACTITIONER

## 2025-05-03 PROCEDURE — 12001 RPR S/N/AX/GEN/TRNK 2.5CM/<: CPT | Performed by: NURSE PRACTITIONER

## 2025-05-03 PROCEDURE — 73090 X-RAY EXAM OF FOREARM: CPT | Performed by: NURSE PRACTITIONER

## 2025-05-03 NOTE — DISCHARGE INSTRUCTIONS
Take the pressure dressing off in an hour.  Clean the wound with soap and water.  Apply topical antibiotic ointment.  Cover with a Band-Aid as needed.  Ice may help with swelling and pain.  Tylenol or Motrin as needed for pain.  Keep the wound clean and dry.  Schedule recheck with your primary physician to have the sutures removed in 7 to 10 days

## 2025-05-03 NOTE — ED PROVIDER NOTES
Patient Seen in: [facility]      History     Chief Complaint   Patient presents with    Laceration/Abrasion     Stated Complaint: FOREARM  CUT  Subjective:   55-year-old male with ADHD presents from home.  Patient is here with a puncture wound to his left forearm.  Injury occurred almost 1 hour prior to arrival.  States he was working in his yard and attempting to cut yard fabric with scissors.  He was having a difficult time so tried to stab the fabric with scissors and accidentally punctured his left forearm.  Having some pain.  He came right here, no pain medications or wound care done at home.  He is right-hand dominant.  States his tetanus is up-to-date    The history is provided by the patient. No  was used.     Objective:   Past Medical History:    Abdominal pain    Bloating    Constipation    Frequent use of laxatives    Irregular bowel habits    Uncomfortable fullness after meals    Wears glasses            Past Surgical History:   Procedure Laterality Date    Hand/finger surgery unlisted Right 1985    Right thumb surgery    Other surgical history      Toe surgery              Social History     Socioeconomic History    Marital status:    Tobacco Use    Smoking status: Never    Smokeless tobacco: Never   Vaping Use    Vaping status: Never Used   Substance and Sexual Activity    Alcohol use: Yes     Alcohol/week: 3.0 standard drinks of alcohol     Types: 3 Standard drinks or equivalent per week     Comment: weekly/socially, 3 drinks at a time    Drug use: Never            Review of Systems    Positive for stated complaint: Laceration/Abrasion    Other systems are as noted in HPI.  Constitutional and vital signs reviewed.      All other systems reviewed and negative except as noted above.    Physical Exam     ED Triage Vitals [05/03/25 1235]   /81   Pulse 75   Resp 18   Temp 97.9 °F (36.6 °C)   Temp src Temporal   SpO2 98 %   O2 Device None (Room air)      Current:/81   Pulse 75   Temp 97.9 °F (36.6 °C) (Temporal)   Resp 18   SpO2 98%     Physical Exam  Vitals and nursing note reviewed.   Constitutional:       General: He is not in acute distress.     Appearance: Normal appearance. He is not ill-appearing or toxic-appearing.   HENT:      Head: Normocephalic and atraumatic.      Nose: Nose normal.      Mouth/Throat:      Mouth: Mucous membranes are moist.      Pharynx: Oropharynx is clear.   Eyes:      Pupils: Pupils are equal, round, and reactive to light.   Cardiovascular:      Rate and Rhythm: Normal rate and regular rhythm.      Pulses: Normal pulses.   Pulmonary:      Effort: Pulmonary effort is normal. No respiratory distress.      Breath sounds: Normal breath sounds.      Comments: Lungs clear.  No adventitious lung sounds.  No distress.  No hypoxia.  Pulse ox 98% ra. Which is normal    Abdominal:      General: Abdomen is flat.      Palpations: Abdomen is soft.   Musculoskeletal:         General: No signs of injury. Normal range of motion.      Cervical back: Normal range of motion and neck supple.   Skin:     General: Skin is warm and dry.      Capillary Refill: Capillary refill takes less than 2 seconds.      Findings: Laceration present.      Comments: Small 0.5 cm deep laceration to left ventral forearm.  Oozing blood but controlled with pressure.  Small surrounding hematoma.  Mildly tender to touch.  No obvious foreign body.  Left hand grasp intact.  CMS intact.   Neurological:      General: No focal deficit present.      Mental Status: He is alert and oriented to person, place, and time.      GCS: GCS eye subscore is 4. GCS verbal subscore is 5. GCS motor subscore is 6.   Psychiatric:         Mood and Affect: Mood normal.         Behavior: Behavior normal.         Thought Content: Thought content normal.         Judgment: Judgment normal.         ED Course   Radiology:  XR FOREARM (2 VIEWS), LEFT (CPT=73090)  Result Date:  5/3/2025  CONCLUSION:  7 mm subcutaneous density in area of soft tissue laceration could potentially represent retained foreign body although could be part of a bandage.  Correlation with clinical findings here is necessary.   LOCATION:  [facility]   Dictated by (CST): Chan Rushing MD on 5/03/2025 at 1:00 PM     Finalized by (CST): Chan Rushing MD on 5/03/2025 at 1:01 PM       Labs Reviewed - No data to display  Procedure Name: Laceration Repair  Indication: Reduce risk of infection  Location: Left forearm  Pre-Procedure Diagnosis: Laceration  Post-Procedure Diagnosis: Repaired Laceration  Informed consent was obtained before procedure started.  PROCEDURE:  The appropriate timeout was taken. The area was prepped and draped in the usual sterile fashion. Local anesthesia was achieved using 1cc of  Lidocaine 1% with epinephrine. The wound was copiously irrigated with NS. No foreign bodies were identified. 2 5-0 Nylon interrupted sutures were placed.    Estimated blood loss was less than 0.5 mL. A dressing with ointment were applied to the area. Anticipatory guidance, as well as standard post-procedure care, was explained. Return precautions were given. The patient tolerated the procedure well without complications.      MDM     Medical Decision Making  Differential diagnoses reflecting the complexity of care include: Left forearm puncture wound, open fracture  Left forearm versus the point of scissors with puncture wound.  Wound is deep, oozing blood but controlled with pressure.  Left hand grasp intact.  CMS intact.  Some tenderness to the area.  Low suspicion for open fracture, patient is agreeable to x-ray.  X-ray of the left forearm is negative for fracture.  There is a density in the soft tissue.  Patient states no chance of foreign body based on injury, the scissors did not break.  I did pressure irrigate the wound and explored it, no foreign body identified.  Most likely dressing material showing up on the x-ray,  patient agrees  Tetanus is up-to-date  Patient declined pain medication  Wound closure with 2 sutures.  Gentle pressure dressing applied to prevent hematoma.  Patient will remove in 1 hour.    Plan of Care: Remove dressing in 1 hour.  Tylenol or Motrin as needed for pain.  Clean wound with soap and water.  Topical antibiotic ointment.  Suture removal in 7 to 10 days    Results and plan of care discussed with the patient/family. They are in agreement with discharge. They understand to follow up with their primary doctor or the referral physician for further evaluation, especially if no improvement.  Also discussed the limitations of immediate care, patient is aware that if symptoms are worse they should go to the emergency room. Verbal and written discharge instructions were given.     My independent interpretation of studies of: No open fracture on left forearm x-ray  Diagnostic tests and medications considered but not ordered were: Pain medication  Shared decision making was done by: Patient declined pain medication  Comorbidities that add complexity to management include: ADHD  External chart review was done and was noted: Seen here 4/25 and treated with Zithromax for lower respiratory tract infection, chest x-ray was negative for pneumonia.  Last tetanus 2020  History obtained by an independent source was from: N/A  Discussions and management was done with: N/A  Social determinants of health that affect care: N/A              Problems Addressed:  Puncture wound of left forearm, initial encounter: acute illness or injury    Amount and/or Complexity of Data Reviewed  External Data Reviewed: radiology and notes.  Radiology: ordered and independent interpretation performed. Decision-making details documented in ED Course.    Risk  OTC drugs.        Disposition and Plan     Clinical Impression:  1. Puncture wound of left forearm, initial encounter         Disposition:  Discharge  5/3/2025  1:27  pm    Follow-up:  Solomon Stark MD  [address]  [facility] [address]  [phone]    Schedule an appointment as soon as possible for a visit   For suture removal          Medications Prescribed:  Discharge Medication List as of 5/3/2025  1:28 PM

## 2025-05-08 PROBLEM — R14.1 ABDOMINAL GAS PAIN: Status: ACTIVE | Noted: 2025-05-08

## 2025-05-08 PROBLEM — R68.81 EARLY SATIETY: Status: ACTIVE | Noted: 2025-05-08

## 2025-05-08 PROCEDURE — 88305 TISSUE EXAM BY PATHOLOGIST: CPT | Performed by: INTERNAL MEDICINE

## 2025-07-02 ENCOUNTER — HOSPITAL ENCOUNTER (OUTPATIENT)
Age: 55
Discharge: HOME | End: 2025-07-02
Payer: COMMERCIAL

## 2025-07-02 VITALS
DIASTOLIC BLOOD PRESSURE: 80 MMHG | HEART RATE: 67 BPM | RESPIRATION RATE: 16 BRPM | SYSTOLIC BLOOD PRESSURE: 123 MMHG | OXYGEN SATURATION: 100 % | TEMPERATURE: 98 F

## 2025-07-02 DIAGNOSIS — H01.004 BLEPHARITIS OF LEFT UPPER EYELID, UNSPECIFIED TYPE: Primary | ICD-10-CM

## 2025-07-02 PROCEDURE — 99213 OFFICE O/P EST LOW 20 MIN: CPT | Performed by: NURSE PRACTITIONER

## 2025-07-02 RX ORDER — ERYTHROMYCIN 5 MG/G
1 OINTMENT OPHTHALMIC EVERY 6 HOURS
Qty: 1 G | Refills: 0 | Status: SHIPPED | OUTPATIENT
Start: 2025-07-02 | End: 2025-07-09

## 2025-07-02 NOTE — ED PROVIDER NOTES
History     Chief Complaint   Patient presents with    Eye Visual Problem       Subjective:   HPI    Frederick Patel, 55 year old male with notable medical history of n/a who presents with Left upper eyelid swelling. Patient reports s/s started Monday w/o precipitating events, but patient did stay at a hotel over the weekend. Denies discharge or vision changes. +itching. He does not wear glasses. Nothing taken for symptoms pta.       Problem List[1]   Objective:   Past Medical History:    Abdominal pain    Bloating    Constipation    Frequent use of laxatives    Irregular bowel habits    Uncomfortable fullness after meals    Wears glasses              Past Surgical History:   Procedure Laterality Date    Hand/finger surgery unlisted Right 1985    Right thumb surgery    Other surgical history      Toe surgery                Social History     Socioeconomic History    Marital status:    Tobacco Use    Smoking status: Never    Smokeless tobacco: Never   Vaping Use    Vaping status: Never Used   Substance and Sexual Activity    Alcohol use: Yes     Alcohol/week: 3.0 standard drinks of alcohol     Types: 3 Standard drinks or equivalent per week     Comment: weekly/socially, 3 drinks at a time    Drug use: Never              Medications Ordered Prior to Encounter[2]      Constitutional and vital signs reviewed.      All other systems reviewed and negative except as noted above.    I have reviewed the family history, social history, allergies, and outpatient medications.     History reviewed from EMR: Encounters, problem list, allergies, medications      Physical Exam     ED Triage Vitals [07/02/25 0813]   /80   Pulse 67   Resp 16   Temp 97.7 °F (36.5 °C)   Temp src Oral   SpO2 100 %   O2 Device None (Room air)       Current:/80   Pulse 67   Temp 97.7 °F (36.5 °C) (Oral)   Resp 16   SpO2 100%       Physical Exam  Vitals and nursing note reviewed.   Constitutional:       General: He is not in  acute distress.     Appearance: Normal appearance. He is normal weight. He is not ill-appearing or toxic-appearing.   HENT:      Head: Normocephalic and atraumatic.      Right Ear: External ear normal.      Left Ear: External ear normal.      Nose: Nose normal. No congestion or rhinorrhea.      Mouth/Throat:      Mouth: Mucous membranes are moist.   Eyes:      Extraocular Movements: Extraocular movements intact.      Conjunctiva/sclera: Conjunctivae normal.      Pupils: Pupils are equal, round, and reactive to light.      Comments: +mild swelling and erythema to Left upper eyelid. EOMs intact. No periorbital swelling / erythema. No conjunctival changes.    Cardiovascular:      Rate and Rhythm: Normal rate.      Pulses: Normal pulses.   Pulmonary:      Effort: Pulmonary effort is normal. No respiratory distress.   Musculoskeletal:         General: No swelling, tenderness or signs of injury. Normal range of motion.      Cervical back: Normal range of motion.   Skin:     General: Skin is warm and dry.      Capillary Refill: Capillary refill takes less than 2 seconds.   Neurological:      General: No focal deficit present.      Mental Status: He is alert and oriented to person, place, and time. Mental status is at baseline.   Psychiatric:         Mood and Affect: Mood normal.         Behavior: Behavior normal.         Thought Content: Thought content normal.         Judgment: Judgment normal.            ED Course     Labs Reviewed - No data to display  No orders to display       Vitals:    07/02/25 0813   BP: 123/80   Pulse: 67   Resp: 16   Temp: 97.7 °F (36.5 °C)   TempSrc: Oral   SpO2: 100%            [facility]        Frederick Patel, 55 year old male with medical history as noted above who presents with Left upper eye lid swelling   - Patient in NAD, VSS   - blepharitis vs stye vs chalazion vs periorbital cellulitis vs other   - Exam most c/w blepharitis   - Supportive care and infection control measures discussed   -  Follow up with primary care provider as needed        ** See ED course below for additional information on care provided / interventions / notable events throughout patient's encounter.           ** I have independently reviewed the radiology images, clinical lab results, and ECG tracings as described above (if applicable)    ** Concerning co-morbidities possibly affecting complaint / care: n/a        Medical Decision Making  Risk  OTC drugs.  Prescription drug management.        Disposition and Plan     Disposition:  Discharge  7/2/2025  8:32 am    Clinical Impression:  1. Blepharitis of left upper eyelid, unspecified type            Home care instructions:     - You may benefit from taking a Zyrtec or Xyzal daily (allergy medication)    Blepharitis / Eyelid infection care measures   - Apply antibiotic ointment to lid margins 4x daily for up to a month (only use while having symptoms) (ointment is OK to go into your eye)   - Apply warm compressed to closed lid for 5-10 minutes, 2-4 times a day. Then, massage lids with cotton swab soaked in a mixture of baby shampoo and water (1:1 ratio)   - Do not use contacts   - Change pillow cases daily for the next couple days   - You may warrant re-evaluation if you have diffuse redness and/or swelling to the area around your eye   - Follow up with your primary care provider as needed      Follow-up:  Solomon Stark MD  [address] [facility] [address] [phone]      As needed          Medications Prescribed:  Current Discharge Medication List        START taking these medications    Details   erythromycin 5 MG/GM Ophthalmic Ointment Apply 1 Application to eye every 6 (six) hours for 7 days. Apply to base of lashes  Qty: 1 g, Refills: 0               Navid Valdovinos, DNP, APRN, AGACNP-BC, FNP-C, CNL  Adult-Gerontology Acute Care & Family Nurse Practitioner  [facility]      The above patient (and/or guardian) was made aware that an appropriate  evaluation has been performed, and that no additional testing is required at this time. In my medical judgment, there is currently no evidence of an immediate life-threatening or surgical condition, therefore discharge is indicated at this time. The patient (and/or guardian) was advised that a small risk still exists that a serious condition could develop. The patient was instructed to arrange close follow-up with their primary care provider (or the referral provider given today). The patient received written and verbal instructions regarding their condition / concerns, demonstrated understanding, and is agreement with the outpatient treatment plan.              [1]   Patient Active Problem List  Diagnosis    Memory loss    Effusion of left knee    Knee internal derangement, left    Medial meniscus tear, left, initial encounter    Special screening for malignant neoplasms, colon    Anxiety    Alopecia    Attention deficit hyperactivity disorder, combined type    Gastroesophageal reflux disease with esophagitis    Primary insomnia    Early satiety    Abdominal bloating   [2]   No current facility-administered medications on file prior to encounter.     Current Outpatient Medications on File Prior to Encounter   Medication Sig Dispense Refill    [] azithromycin (ZITHROMAX Z-NANO) 250 MG Oral Tab 500 mg once followed by 250 mg daily x 4 days 6 tablet 0    [] amphetamine-dextroamphetamine ER (ADDERALL XR) 30 MG Oral Capsule SR 24 Hr Take 1 capsule (30 mg total) by mouth daily. 30 capsule 0    [] amphetamine-dextroamphetamine ER (ADDERALL XR) 30 MG Oral Capsule SR 24 Hr Take 1 capsule (30 mg total) by mouth daily. 30 capsule 0    amphetamine-dextroamphetamine ER (ADDERALL XR) 30 MG Oral Capsule SR 24 Hr Take 1 capsule (30 mg total) by mouth daily. 30 capsule 0    [] amphetamine-dextroamphetamine (ADDERALL) 10 MG Oral Tab Take 1 tablet (10 mg total) by mouth daily. 30 tablet 0    []  amphetamine-dextroamphetamine (ADDERALL) 10 MG Oral Tab Take 1 tablet (10 mg total) by mouth daily. 30 tablet 0    amphetamine-dextroamphetamine (ADDERALL) 10 MG Oral Tab Take 1 tablet (10 mg total) by mouth daily. 30 tablet 0    [] amphetamine-dextroamphetamine ER (ADDERALL XR) 30 MG Oral Capsule SR 24 Hr Take 1 capsule (30 mg total) by mouth daily. 30 capsule 0    Sildenafil Citrate 100 MG Oral Tab Take 1 tablet (100 mg total) by mouth as needed.      finasteride 1 MG Oral Tab Take 1 tablet (1 mg total) by mouth in the morning.

## 2025-07-02 NOTE — DISCHARGE INSTRUCTIONS
- You may benefit from taking a Zyrtec or Xyzal daily (allergy medication)    Blepharitis / Eyelid infection care measures   - Apply antibiotic ointment to lid margins 4x daily for up to a month (only use while having symptoms) (ointment is OK to go into your eye)   - Apply warm compressed to closed lid for 5-10 minutes, 2-4 times a day. Then, massage lids with cotton swab soaked in a mixture of baby shampoo and water (1:1 ratio)   - Do not use contacts   - Change pillow cases daily for the next couple days   - You may warrant re-evaluation if you have diffuse redness and/or swelling to the area around your eye   - Follow up with your primary care provider as needed